# Patient Record
Sex: FEMALE | Employment: STUDENT | URBAN - METROPOLITAN AREA
[De-identification: names, ages, dates, MRNs, and addresses within clinical notes are randomized per-mention and may not be internally consistent; named-entity substitution may affect disease eponyms.]

---

## 2023-04-20 ENCOUNTER — OFFICE VISIT (OUTPATIENT)
Dept: OBGYN CLINIC | Facility: CLINIC | Age: 28
End: 2023-04-20

## 2023-04-20 VITALS
WEIGHT: 201 LBS | HEIGHT: 64 IN | DIASTOLIC BLOOD PRESSURE: 77 MMHG | BODY MASS INDEX: 34.31 KG/M2 | SYSTOLIC BLOOD PRESSURE: 111 MMHG

## 2023-04-20 DIAGNOSIS — Z01.419 ENCOUNTER FOR WELL WOMAN EXAM WITH ROUTINE GYNECOLOGICAL EXAM: Primary | ICD-10-CM

## 2023-04-20 DIAGNOSIS — Z11.3 SCREEN FOR STD (SEXUALLY TRANSMITTED DISEASE): ICD-10-CM

## 2023-04-20 DIAGNOSIS — N92.6 IRREGULAR MENSES: ICD-10-CM

## 2023-04-20 DIAGNOSIS — E04.9 ENLARGED THYROID: ICD-10-CM

## 2023-04-20 PROCEDURE — 87660 TRICHOMONAS VAGIN DIR PROBE: CPT | Performed by: OBSTETRICS & GYNECOLOGY

## 2023-04-20 PROCEDURE — 87591 N.GONORRHOEAE DNA AMP PROB: CPT | Performed by: OBSTETRICS & GYNECOLOGY

## 2023-04-20 PROCEDURE — 87491 CHLMYD TRACH DNA AMP PROBE: CPT | Performed by: OBSTETRICS & GYNECOLOGY

## 2023-04-20 PROCEDURE — 87510 GARDNER VAG DNA DIR PROBE: CPT | Performed by: OBSTETRICS & GYNECOLOGY

## 2023-04-20 PROCEDURE — 87480 CANDIDA DNA DIR PROBE: CPT | Performed by: OBSTETRICS & GYNECOLOGY

## 2023-04-21 ENCOUNTER — TELEPHONE (OUTPATIENT)
Dept: OBGYN CLINIC | Facility: CLINIC | Age: 28
End: 2023-04-21

## 2023-04-21 LAB
C TRACH DNA SPEC QL NAA+PROBE: POSITIVE
N GONORRHOEA DNA SPEC QL NAA+PROBE: NEGATIVE

## 2023-04-21 RX ORDER — DOXYCYCLINE HYCLATE 100 MG/1
100 CAPSULE ORAL 2 TIMES DAILY
Qty: 14 CAPSULE | Refills: 0 | Status: SHIPPED | OUTPATIENT
Start: 2023-04-21 | End: 2023-04-21

## 2023-04-21 RX ORDER — DOXYCYCLINE HYCLATE 100 MG/1
100 CAPSULE ORAL 2 TIMES DAILY
Qty: 14 CAPSULE | Refills: 1 | Status: SHIPPED | OUTPATIENT
Start: 2023-04-21 | End: 2023-04-28

## 2023-04-21 RX ORDER — AZITHROMYCIN 500 MG/1
TABLET, FILM COATED ORAL
Qty: 2 TABLET | Refills: 0 | Status: SHIPPED | OUTPATIENT
Start: 2023-04-21 | End: 2023-04-25

## 2023-04-21 RX ORDER — AZITHROMYCIN 1 G
1 PACKET (EA) ORAL ONCE
Qty: 1 EACH | Refills: 0 | Status: SHIPPED | OUTPATIENT
Start: 2023-04-21 | End: 2023-04-21

## 2023-04-21 NOTE — TELEPHONE ENCOUNTER
Patient name and  verified. Patient informed positive lab results. Patient treated per office protocol. Denies allergies. Advised patient can treat partner. Patient states she will talk to him first and then contact office. STI prevention reviewed with patient. STI form generated and faxed to [facility].

## 2023-04-21 NOTE — TELEPHONE ENCOUNTER
Patient name and  verified. Patient contacted office and was not able to provide partners information for this RN to send medication. Informed patient a new rx can be ordered for patient containing one refill. Also informed patient unsure if health plan will cover both medications. Verbalized understanding.

## 2023-05-15 LAB — HPV I/H RISK 1 DNA SPEC QL NAA+PROBE: NEGATIVE

## 2023-05-16 ENCOUNTER — HOSPITAL ENCOUNTER (OUTPATIENT)
Dept: ULTRASOUND IMAGING | Age: 28
Discharge: HOME | End: 2023-05-16
Attending: OBSTETRICS & GYNECOLOGY
Payer: COMMERCIAL

## 2023-05-16 DIAGNOSIS — E04.9 ENLARGED THYROID: ICD-10-CM

## 2023-05-16 PROCEDURE — 76536 US EXAM OF HEAD AND NECK: CPT | Performed by: OBSTETRICS & GYNECOLOGY

## 2023-05-18 ENCOUNTER — LAB ENCOUNTER (OUTPATIENT)
Dept: LAB | Age: 28
End: 2023-05-18
Attending: OBSTETRICS & GYNECOLOGY
Payer: COMMERCIAL

## 2023-05-18 DIAGNOSIS — N92.6 IRREGULAR MENSES: ICD-10-CM

## 2023-05-18 DIAGNOSIS — N92.6 IRREGULAR MENSTRUAL CYCLE: Primary | ICD-10-CM

## 2023-05-18 DIAGNOSIS — Z11.3 SCREEN FOR STD (SEXUALLY TRANSMITTED DISEASE): ICD-10-CM

## 2023-05-18 LAB
CHOLEST SERPL-MCNC: 210 MG/DL (ref ?–200)
DHEA-S SERPL-MCNC: 209.7 UG/DL
EST. AVERAGE GLUCOSE BLD GHB EST-MCNC: 100 MG/DL (ref 68–126)
ESTRADIOL SERPL-MCNC: 34.9 PG/ML
FASTING PATIENT GLUCOSE ANSWER: YES
FASTING PATIENT LIPID ANSWER: YES
FSH SERPL-ACNC: 5.7 MIU/ML
GLUCOSE BLD-MCNC: 90 MG/DL (ref 70–99)
HBA1C MFR BLD: 5.1 % (ref ?–5.7)
HBV SURFACE AG SER-ACNC: 0.1 [IU]/L
HBV SURFACE AG SERPL QL IA: NONREACTIVE
HCV AB SERPL QL IA: NONREACTIVE
HDLC SERPL-MCNC: 55 MG/DL (ref 40–59)
INSULIN SERPL-ACNC: 20.7 MU/L (ref 3–25)
LDLC SERPL CALC-MCNC: 134 MG/DL (ref ?–100)
LH SERPL-ACNC: 6.2 MIU/ML
NONHDLC SERPL-MCNC: 155 MG/DL (ref ?–130)
PROLACTIN SERPL-MCNC: 20.6 NG/ML
T PALLIDUM AB SER QL IA: NONREACTIVE
TRIGL SERPL-MCNC: 116 MG/DL (ref 30–149)
TSI SER-ACNC: 1.51 MIU/ML (ref 0.36–3.74)
VLDLC SERPL CALC-MCNC: 21 MG/DL (ref 0–30)

## 2023-05-18 PROCEDURE — 84443 ASSAY THYROID STIM HORMONE: CPT

## 2023-05-18 PROCEDURE — 83001 ASSAY OF GONADOTROPIN (FSH): CPT

## 2023-05-18 PROCEDURE — 86803 HEPATITIS C AB TEST: CPT

## 2023-05-18 PROCEDURE — 83002 ASSAY OF GONADOTROPIN (LH): CPT

## 2023-05-18 PROCEDURE — 84410 TESTOSTERONE BIOAVAILABLE: CPT

## 2023-05-18 PROCEDURE — 83036 HEMOGLOBIN GLYCOSYLATED A1C: CPT

## 2023-05-18 PROCEDURE — 86780 TREPONEMA PALLIDUM: CPT | Performed by: OBSTETRICS & GYNECOLOGY

## 2023-05-18 PROCEDURE — 82627 DEHYDROEPIANDROSTERONE: CPT

## 2023-05-18 PROCEDURE — 80061 LIPID PANEL: CPT

## 2023-05-18 PROCEDURE — 82947 ASSAY GLUCOSE BLOOD QUANT: CPT

## 2023-05-18 PROCEDURE — 83525 ASSAY OF INSULIN: CPT

## 2023-05-18 PROCEDURE — 82670 ASSAY OF TOTAL ESTRADIOL: CPT

## 2023-05-18 PROCEDURE — 87389 HIV-1 AG W/HIV-1&-2 AB AG IA: CPT | Performed by: OBSTETRICS & GYNECOLOGY

## 2023-05-18 PROCEDURE — 87340 HEPATITIS B SURFACE AG IA: CPT

## 2023-05-18 PROCEDURE — 84146 ASSAY OF PROLACTIN: CPT

## 2023-05-24 ENCOUNTER — TELEPHONE (OUTPATIENT)
Dept: OBGYN CLINIC | Facility: CLINIC | Age: 28
End: 2023-05-24

## 2023-05-24 LAB
SEX HORM BIND GLOB: 27.2 NMOL/L
TESTOST % FREE+WEAK BND: 30.9 %
TESTOST FREE+WEAK BND: 6.8 NG/DL
TESTOSTERONE TOT /MS: 22.1 NG/DL

## 2024-04-23 ENCOUNTER — OFFICE VISIT (OUTPATIENT)
Dept: OBGYN CLINIC | Facility: CLINIC | Age: 29
End: 2024-04-23

## 2024-04-23 VITALS — HEIGHT: 64 IN | BODY MASS INDEX: 35 KG/M2

## 2024-04-23 DIAGNOSIS — E28.0 ESTRADIOL EXCESS: ICD-10-CM

## 2024-04-23 DIAGNOSIS — Z12.4 ENCOUNTER FOR PAPANICOLAOU SMEAR FOR CERVICAL CANCER SCREENING: ICD-10-CM

## 2024-04-23 DIAGNOSIS — Z01.419 ENCOUNTER FOR WELL WOMAN EXAM WITH ROUTINE GYNECOLOGICAL EXAM: Primary | ICD-10-CM

## 2024-04-23 DIAGNOSIS — R87.610 ASCUS OF CERVIX WITH NEGATIVE HIGH RISK HPV: ICD-10-CM

## 2024-04-23 PROCEDURE — 90715 TDAP VACCINE 7 YRS/> IM: CPT | Performed by: OBSTETRICS & GYNECOLOGY

## 2024-04-23 PROCEDURE — 90471 IMMUNIZATION ADMIN: CPT | Performed by: OBSTETRICS & GYNECOLOGY

## 2024-04-23 PROCEDURE — 99395 PREV VISIT EST AGE 18-39: CPT | Performed by: OBSTETRICS & GYNECOLOGY

## 2024-04-23 NOTE — PROGRESS NOTES
[facility]  Obstetrics and Gynecology  Gynecology Visit    Chief Complaint   Patient presents with    Annual           Georgiamike Hyatt is a 28 year old female who presents for annual exam.    LMP: 03/23/2024.    Menses regular: yes.    Menstrual flow normal: yes.    Birth control or HRT:  0.   Refill 0  Last Pap Smear: 04/20/2023.  Any history of abnormal paps: No hx abn   Last MMG: n/a  Any Medication Refills needed today?: no  Sleep: 7-8 hours.    Diet: fair.    Exercise: 3-4 x weekly.   Screening labs/Blood work today: no.     Colonoscopy (if over 46 y/o): n/a.   Gardasil:(age 9-46 y/o) up to date.   Genetic Cancer screen (if indicated): no.   Flu (Aug-April): n/a.TDAP (every 10 years) 04/23/2024.      Additional Problems/concerns: Patient complains of fatigue.      Next Appt: annual scheduled    Immunization History   Administered Date(s) Administered    Covid-19 Vaccine Pfizer 30 mcg/0.3 ml 03/27/2021, 04/17/2021    DTAP 02/14/1996, 04/17/1996, 05/29/1996, 05/16/1997, 04/21/2000    DTAP INFANRIX 04/21/2000    DTP 02/14/1996, 04/17/1996, 05/29/1996, 05/16/1997, 04/21/2000    FLUZONE 6 months and older PFS 0.5 ml (49731) 09/25/2019    Fluvirin, 3 Years & >, Im 01/02/2013    HEP A,Ped/Adol,(2 Dose) 04/21/2008, 09/24/2010    HEP B, Ped/Adol 12/13/1995, 02/14/1996, 09/18/1996    HIB 02/14/1996, 02/14/1996, 04/17/1996, 04/17/1996, 05/29/1996, 05/29/1996, 02/14/1997, 02/14/1997    HPV (Gardasil) 04/21/2008, 08/18/2008, 05/27/2009    Hib, Unspecified Formulation 02/14/1996, 04/17/1996, 05/29/1996, 02/14/1997    Hpv Virus Vaccine 9 Sherine Im 04/20/2023    IPV 02/14/1996, 04/17/1996, 05/20/1996, 05/16/1997    Influenza 09/24/2010, 02/02/2012    MMR 11/13/1996, 04/21/2000    Meningococcal-Menactra 05/27/2009    OPV 02/14/1996, 04/17/1996, 05/29/1996, 02/14/1997, 05/16/1997, 04/21/2000    TDAP 04/13/2010    Varicella 04/21/2000, 04/21/2008       No current outpatient medications on file.    No Known Allergies    OB  History    Para Term  AB Living   0 0 0 0 0 0   SAB IAB Ectopic Multiple Live Births   0 0 0 0 0       No past medical history on file.    No past surgical history on file.    Family History   Problem Relation Age of Onset    Other (ovarian cyst) Mother     Other (ovarian cyst) Other         Tobacco  Allergies         Social History     Socioeconomic History    Marital status: Single     Spouse name: Not on file    Number of children: Not on file    Years of education: Not on file    Highest education level: Not on file   Occupational History    Not on file   Tobacco Use    Smoking status: Never     Passive exposure: Never    Smokeless tobacco: Never   Vaping Use    Vaping status: Never Used   Substance and Sexual Activity    Alcohol use: Yes     Comment: 1 time per month, socially    Drug use: Never    Sexual activity: Not on file   Other Topics Concern    Not on file   Social History Narrative    Not on file     Social Determinants of Health     Financial Resource Strain: Not on file   Food Insecurity: Not on file   Transportation Needs: Not on file   Physical Activity: Not on file   Stress: Not on file   Social Connections: Not on file   Housing Stability: At Risk (2023)    Received from [facility]     Living Situation: Not on file     Housing Problems: Not on file       Ht 5' 4\" (1.626 m)   Wt (P) 228 lb 6.3 oz (103.6 kg)   LMP 2024   BMI (P) 39.20 kg/m²     Wt Readings from Last 3 Encounters:   24 (P) 228 lb 6.3 oz (103.6 kg)   23 201 lb (91.2 kg)         Health Maintenance   Topic Date Due    Influenza Vaccine (1) 2021    Screen for Cervical Cancer 2021    DTaP,Tdap and Td Vaccines (3 - Td or Tdap) 2025    Hepatitis C Screening Completed    HIV Screening Completed    COVID-19 Vaccine Completed     Review of Systems   General: Present- Feeling well. Not Present- Chills, Fever, Weight Gain and Weight Loss.  HEENT: Not Present- Headache  and Sore Throat.  Respiratory: Not Present- Cough, Difficulty Breathing, Hemoptysis and Sputum Production.  Cardiovascular: Not Present- Chest Pain, Elevated Blood Pressure, Fainting / Blacking Out and Shortness of Breath.  Gastrointestinal: Not Present- Constipation, Diarrhea, Nausea and Vomiting.  Female Genitourinary: Not Present- Discharge, Dysuria and Frequency.  Musculoskeletal: Not Present- Leg Cramps and Swelling of Extremities.  Neurological: Not Present- Dizziness and Headaches.  Psychiatric: Not Present- Anxiety and Depression.  Endocrine: Not Present- Appetite Changes, Hair Changes and Thyroid Problems.  Hematology: Not Present- Easy Bruising and Excessive bleeding.  All other systems negative     Physical Exam The physical exam findings are as follows:     General   Mental Status - Alert. General Appearance - Cooperative. Orientation - Oriented X4. Build & Nutrition - Well nourished.    Head and Neck  Thyroid   Gland Characteristics - normal size and consistency.    Chest and Lung Exam   Inspection:   Chest Wall: - Normal.  Percussion:   Quality and Intensity: - Percussion normal.  Palpation: - Palpation normal.  Auscultation:   Breath sounds: - Normal.  Adventitious sounds: - No Adventitious sounds.    Breast   Nipples: Characteristics - Bilateral - Normal. Discharge - Bilateral - None.  Breast - Bilateral - Symmetric.    Cardiovascular   Auscultation: Rhythm - Regular. Heart Sounds - Normal heart sounds.  Murmurs & Other Heart Sounds: Auscultation of the heart reveals - No Murmurs.    Abdomen   Inspection: Inspection of the abdomen reveals - No Hernias. Incisional scars - no incisional scars.  Palpation/Percussion: Palpation and Percussion of the abdomen reveal - Non Tender and No Palpable abdominal masses.  Liver: - Normal.  Auscultation: Auscultation of the abdomen reveals - Bowel sounds normal.    Female Genitourinary     External Genitalia   Perineum - Normal. Bartholin's Gland - Bilateral -  Normal. Clitoris - Normal.  Introitus: Characteristics - No Cystocele, Enterocele or Rectocele. Discharge - None.  Labia Majora: Lesions - Bilateral - None. Characteristics - Bilateral - Normal.  Labia Minora: Lesions - Bilateral - None. Characteristics - Bilateral - Normal.  Urethra: Characteristics - Normal. Discharge - None.  [place]bee Gland - Bilateral - Normal.  Vulva: Characteristics - Normal. Lesions - None.    Speculum & Bimanual   Vagina:   Vaginal Wall: - Normal.  Vaginal Lesions - None. Vaginal Mucosa - Normal.  Cervix: Characteristics - No Motion tenderness. Discharge - None.  Uterus: Characteristics - Normal. Position - Midposition.  Adnexa: Characteristics - Bilateral - Normal. Masses - No Adnexal Masses.      Rectal   Anorectal Exam: External - normal external exam.    Peripheral Vascular   Upper Extremity:   Palpation: - Pulses bilaterally normal.  Lower Extremity: Inspection - Bilateral - Inspection Normal.  Palpation: Edema - Bilateral - No edema.    Neurologic   Mental Status: - Normal.    Lymphatic  General Lymphatics   Description - Normal .       1. Encounter for well woman exam with routine gynecological exam  - ThinPrep PAP with HPV Reflex Request B; Future    2. Encounter for Papanicolaou smear for cervical cancer screening  - ThinPrep PAP with HPV Reflex Request B; Future    3. ASCUS of cervix with negative high risk HPV  - ThinPrep PAP with HPV Reflex Request B; Future    4. Estradiol excess  - US PELVIS (TRANSABDOMINAL AND TRANSVAGINAL) (CPT=76856/81009); Future

## 2024-04-27 LAB
.: NORMAL
.: NORMAL

## 2024-05-14 ENCOUNTER — HOSPITAL ENCOUNTER (OUTPATIENT)
Dept: ULTRASOUND IMAGING | Age: 29
Discharge: HOME | End: 2024-05-14
Attending: OBSTETRICS & GYNECOLOGY

## 2024-05-14 DIAGNOSIS — E28.0 ESTRADIOL EXCESS: ICD-10-CM

## 2024-05-14 PROCEDURE — 76856 US EXAM PELVIC COMPLETE: CPT | Performed by: OBSTETRICS & GYNECOLOGY

## 2024-05-14 PROCEDURE — 76830 TRANSVAGINAL US NON-OB: CPT | Performed by: OBSTETRICS & GYNECOLOGY

## 2024-12-13 ENCOUNTER — OFFICE VISIT (OUTPATIENT)
Dept: FAMILY MEDICINE CLINIC | Facility: CLINIC | Age: 29
End: 2024-12-13
Payer: COMMERCIAL

## 2024-12-13 VITALS
HEIGHT: 64 IN | SYSTOLIC BLOOD PRESSURE: 110 MMHG | RESPIRATION RATE: 18 BRPM | OXYGEN SATURATION: 98 % | DIASTOLIC BLOOD PRESSURE: 60 MMHG | WEIGHT: 235 LBS | BODY MASS INDEX: 40.12 KG/M2 | HEART RATE: 101 BPM

## 2024-12-13 DIAGNOSIS — Z00.00 ROUTINE GENERAL MEDICAL EXAMINATION AT A HEALTH CARE FACILITY: Primary | ICD-10-CM

## 2024-12-13 DIAGNOSIS — N92.6 MISSED PERIODS: ICD-10-CM

## 2024-12-13 DIAGNOSIS — Z00.00 LABORATORY EXAMINATION ORDERED AS PART OF A ROUTINE GENERAL MEDICAL EXAMINATION: ICD-10-CM

## 2024-12-13 DIAGNOSIS — R53.83 OTHER FATIGUE: ICD-10-CM

## 2024-12-13 DIAGNOSIS — Z23 NEED FOR VACCINATION: ICD-10-CM

## 2024-12-13 DIAGNOSIS — D50.9 IRON DEFICIENCY ANEMIA, UNSPECIFIED IRON DEFICIENCY ANEMIA TYPE: ICD-10-CM

## 2024-12-13 DIAGNOSIS — E55.9 VITAMIN D DEFICIENCY: ICD-10-CM

## 2024-12-13 LAB
CONTROL LINE PRESENT WITH A CLEAR BACKGROUND (YES/NO): YES YES/NO
KIT LOT #: NORMAL NUMERIC
PREGNANCY TEST, URINE: NEGATIVE

## 2024-12-13 NOTE — PROGRESS NOTES
HPI:   Georgia Hyatt is a 29 year old female new patient who presents for a complete physical exam. Patient complains of weight gain. Doesn't feel she's changed eating habits, but is gaining weight.     Does she want a flu vaccine today? yes  Last pap 4/2/24; WNL. Sees gyne.   Last eye exam? This year  Last dental exam? Over 5 years     Health History  Periods: was regular; no period within 3 months. Last one in August 2024. Has taken pregnancy tests and they've been negative. Will do one today for reassurance. Does have unprotected intercourse, so pregnancy is possible.   Last Pap (age 25): 4/24/25 (WNL), sees gyne.   Last Mammogram (age 40): not indicated  Doing SBE: yes, discussed how to do monthly  Dexa Scan (50-64 high risk or 65 and up): not indicated   Colonoscopy (age 45-75): not indicated   Diet healthy: eats healthy  Some intermittent diarrhea and constipation. Is doing a daily fiber supplement. Can consider allergy testing if this becomes very bothersome.   Exercise status: cardio, weightlifting 3-5 x a week an hour each time.   Vaccination Status: UTD  Confirm medications & allergies     Family History   No significant hx    Social History  Socioeconomic History  Marital status: no   Number of children: no   Occupation: , different hours  Tobacco Use  Smoking status: no  Vaping Use: Never used  Substance Use and Sexual Activity  Alcohol use: Yes, 2 x monthly   Comment: occ  Drug use: Never  Sexual activity: Yes  Partners: Male  Birth control/protection: No    Other Topics  Concerns:No       Immunization History   Administered Date(s) Administered    Covid-19 Vaccine Pfizer 30 mcg/0.3 ml 03/27/2021, 04/17/2021    DTAP 02/14/1996, 04/17/1996, 05/29/1996, 05/16/1997, 04/21/2000, 04/21/2000    DTP 02/14/1996, 04/17/1996, 05/29/1996, 05/16/1997, 04/21/2000    FLUZONE 6 months and older PFS 0.5 ml (57890) 09/25/2019    Fluvirin, 3 Years & >, Im 01/02/2013    HEP A,Ped/Adol,(2 Dose) 04/21/2008,  09/24/2010    HEP B, Ped/Adol 12/13/1995, 02/14/1996, 09/18/1996    HIB 02/14/1996, 04/17/1996, 05/29/1996, 02/14/1997    HIB PRP-OMP 02/14/1996, 04/17/1996, 05/29/1996, 02/14/1997    HPV (Gardasil) 04/21/2008, 08/18/2008, 05/27/2009    Hib, Unspecified Formulation 02/14/1996, 04/17/1996, 05/29/1996, 02/14/1997    Hpv Virus Vaccine 9 Sherine Im 04/20/2023    IPV 02/14/1996, 04/17/1996, 05/20/1996, 05/16/1997    Influenza 09/24/2010, 02/02/2012    MMR 11/13/1996, 04/21/2000    Meningococcal-Menactra 05/27/2009    OPV 02/14/1996, 04/17/1996, 05/29/1996, 02/14/1997, 05/16/1997, 04/21/2000    TDAP 04/13/2010, 04/23/2024    Varicella 04/21/2000, 04/21/2008     Wt Readings from Last 6 Encounters:   12/13/24 235 lb (106.6 kg)   04/23/24 (P) 228 lb 6.3 oz (103.6 kg)   04/20/23 201 lb (91.2 kg)     Body mass index is 40.34 kg/m².     Lab Results   Component Value Date    GLU 90 05/18/2023     Lab Results   Component Value Date    CHOLEST 210 (H) 05/18/2023     Lab Results   Component Value Date    HDL 55 05/18/2023     Lab Results   Component Value Date     (H) 05/18/2023     No results found for: \"AST\"  No results found for: \"ALT\"    No current outpatient medications on file.      History reviewed. No pertinent past medical history.   History reviewed. No pertinent surgical history.   Family History   Problem Relation Age of Onset    Other (ovarian cyst) Mother     Other (ovarian cyst) Other       Social History:   Social History     Socioeconomic History    Marital status: Single   Tobacco Use    Smoking status: Never     Passive exposure: Never    Smokeless tobacco: Never   Vaping Use    Vaping status: Never Used   Substance and Sexual Activity    Alcohol use: Yes     Comment: 1 time per month, socially    Drug use: Never     Social Drivers of Health      Received from [facility]        REVIEW OF SYSTEMS:   GENERAL: feels well otherwise  SKIN: denies any unusual skin lesions  EYES:denies blurred vision  or double vision  HEENT: denies nasal congestion, sinus pain or ST  LUNGS: denies shortness of breath with exertion  CARDIOVASCULAR: denies chest pain on exertion  GI: denies abdominal pain,denies heartburn, sometimes gets intermittent diarrhea and constipation. Did a home allergy test where she submitted hair and it suggested she was allergic to daily and chicken. She eats both.   : denies dysuria, vaginal discharge or itching,periods were regular but has missed the past few months.    MUSCULOSKELETAL: denies back pain  NEURO: denies headaches  PSYCHE: denies depression or anxiety  HEMATOLOGIC: hx of anemia  ENDOCRINE: denies thyroid history  ALL/ASTHMA: denies hx of allergy or asthma    EXAM:   /60 (BP Location: Left arm, Patient Position: Sitting, Cuff Size: adult)   Pulse 101   Resp 18   Ht 5' 4\" (1.626 m)   Wt 235 lb (106.6 kg)   LMP 08/30/2024   SpO2 98%   BMI 40.34 kg/m²   Body mass index is 40.34 kg/m².   GENERAL: well developed, well nourished,in no apparent distress  SKIN: no rashes,no suspicious lesions  HEENT: atraumatic, normocephalic,ears and throat are clear  EYES:PERRLA, EOMI, normal optic disk,conjunctiva are clear  NECK: supple,no adenopathy,no bruits, thyroid WNL  CHEST: no chest tenderness  LUNGS: clear to auscultation  CARDIO: RRR without murmur  GI: good BS's,no masses, HSM or tenderness  MUSCULOSKELETAL: back is not tender,FROM of the back  EXTREMITIES: no cyanosis, clubbing or edema  NEURO: Oriented times three,cranial nerves are intact,motor and sensory are grossly intact    ASSESSMENT AND PLAN:   Georgia Hyatt is a 29 year old female who presents for a complete physical exam.  Self breast exam explained. Health maintenance, will check fasting Lipids, CMP, and CBC. Pt' s weight is Body mass index is 40.34 kg/m²., recommended low fat diet and aerobic exercise 30 minutes three times weekly.  The patient indicates understanding of these issues and agrees to the plan.    The  patient is asked to return for CPX in 1 year or sooner if she wants to further discuss weight loss. IN OFFICE PREGNANCY TEST IS NEGATIVE, if pt does not get period in another 1-2 months, can discuss giving her a prescription to bring on a bleed.

## 2024-12-16 ENCOUNTER — LAB ENCOUNTER (OUTPATIENT)
Dept: LAB | Age: 29
End: 2024-12-16
Payer: COMMERCIAL

## 2024-12-16 DIAGNOSIS — Z00.00 LABORATORY EXAMINATION ORDERED AS PART OF A ROUTINE GENERAL MEDICAL EXAMINATION: ICD-10-CM

## 2024-12-16 DIAGNOSIS — E55.9 VITAMIN D DEFICIENCY: ICD-10-CM

## 2024-12-16 DIAGNOSIS — R53.83 OTHER FATIGUE: ICD-10-CM

## 2024-12-16 DIAGNOSIS — D50.9 IRON DEFICIENCY ANEMIA, UNSPECIFIED IRON DEFICIENCY ANEMIA TYPE: ICD-10-CM

## 2024-12-16 LAB
ALBUMIN SERPL-MCNC: 4.3 G/DL (ref 3.2–4.8)
ALBUMIN/GLOB SERPL: 1.2 {RATIO} (ref 1–2)
ALP LIVER SERPL-CCNC: 118 U/L
ALT SERPL-CCNC: 54 U/L
ANION GAP SERPL CALC-SCNC: 4 MMOL/L (ref 0–18)
AST SERPL-CCNC: 28 U/L (ref ?–34)
BASOPHILS # BLD AUTO: 0.02 X10(3) UL (ref 0–0.2)
BASOPHILS NFR BLD AUTO: 0.2 %
BILIRUB SERPL-MCNC: 0.3 MG/DL (ref 0.3–1.2)
BILIRUB UR QL STRIP.AUTO: NEGATIVE
BUN BLD-MCNC: 11 MG/DL (ref 9–23)
CALCIUM BLD-MCNC: 10.3 MG/DL (ref 8.7–10.4)
CHLORIDE SERPL-SCNC: 109 MMOL/L (ref 98–112)
CHOLEST SERPL-MCNC: 159 MG/DL (ref ?–200)
CLARITY UR REFRACT.AUTO: CLEAR
CO2 SERPL-SCNC: 23 MMOL/L (ref 21–32)
CORTIS SERPL-MCNC: 13.5 UG/DL
CREAT BLD-MCNC: 0.57 MG/DL
DEPRECATED HBV CORE AB SER IA-ACNC: 70 NG/ML
EGFRCR SERPLBLD CKD-EPI 2021: 126 ML/MIN/1.73M2 (ref 60–?)
EOSINOPHIL # BLD AUTO: 0.16 X10(3) UL (ref 0–0.7)
EOSINOPHIL NFR BLD AUTO: 1.7 %
ERYTHROCYTE [DISTWIDTH] IN BLOOD BY AUTOMATED COUNT: 12.8 %
FASTING PATIENT LIPID ANSWER: YES
FASTING STATUS PATIENT QL REPORTED: YES
GLOBULIN PLAS-MCNC: 3.5 G/DL (ref 2–3.5)
GLUCOSE BLD-MCNC: 86 MG/DL (ref 70–99)
GLUCOSE UR STRIP.AUTO-MCNC: NORMAL MG/DL
HCT VFR BLD AUTO: 41.3 %
HDLC SERPL-MCNC: 39 MG/DL (ref 40–59)
HGB BLD-MCNC: 13.6 G/DL
IMM GRANULOCYTES # BLD AUTO: 0.04 X10(3) UL (ref 0–1)
IMM GRANULOCYTES NFR BLD: 0.4 %
IRON SATN MFR SERPL: 22 %
IRON SERPL-MCNC: 77 UG/DL
KETONES UR STRIP.AUTO-MCNC: NEGATIVE MG/DL
LDLC SERPL CALC-MCNC: 89 MG/DL (ref ?–100)
LEUKOCYTE ESTERASE UR QL STRIP.AUTO: NEGATIVE
LYMPHOCYTES # BLD AUTO: 3.17 X10(3) UL (ref 1–4)
LYMPHOCYTES NFR BLD AUTO: 32.7 %
MCH RBC QN AUTO: 31.3 PG (ref 26–34)
MCHC RBC AUTO-ENTMCNC: 32.9 G/DL (ref 31–37)
MCV RBC AUTO: 95.2 FL
MONOCYTES # BLD AUTO: 0.66 X10(3) UL (ref 0.1–1)
MONOCYTES NFR BLD AUTO: 6.8 %
NEUTROPHILS # BLD AUTO: 5.64 X10 (3) UL (ref 1.5–7.7)
NEUTROPHILS # BLD AUTO: 5.64 X10(3) UL (ref 1.5–7.7)
NEUTROPHILS NFR BLD AUTO: 58.2 %
NITRITE UR QL STRIP.AUTO: NEGATIVE
NONHDLC SERPL-MCNC: 120 MG/DL (ref ?–130)
OSMOLALITY SERPL CALC.SUM OF ELEC: 281 MOSM/KG (ref 275–295)
PH UR STRIP.AUTO: 5.5 [PH] (ref 5–8)
PLATELET # BLD AUTO: 325 10(3)UL (ref 150–450)
POTASSIUM SERPL-SCNC: 4.1 MMOL/L (ref 3.5–5.1)
PROT SERPL-MCNC: 7.8 G/DL (ref 5.7–8.2)
PROT UR STRIP.AUTO-MCNC: NEGATIVE MG/DL
RBC # BLD AUTO: 4.34 X10(6)UL
RBC UR QL AUTO: NEGATIVE
SODIUM SERPL-SCNC: 136 MMOL/L (ref 136–145)
SP GR UR STRIP.AUTO: 1.02 (ref 1–1.03)
TOTAL IRON BINDING CAPACITY: 343 UG/DL (ref 250–425)
TRANSFERRIN SERPL-MCNC: 271 MG/DL (ref 250–380)
TRIGL SERPL-MCNC: 183 MG/DL (ref 30–149)
TSI SER-ACNC: 3.06 UIU/ML (ref 0.55–4.78)
UROBILINOGEN UR STRIP.AUTO-MCNC: NORMAL MG/DL
VIT B12 SERPL-MCNC: 492 PG/ML (ref 211–911)
VIT D+METAB SERPL-MCNC: 12.7 NG/ML (ref 30–100)
VLDLC SERPL CALC-MCNC: 30 MG/DL (ref 0–30)
WBC # BLD AUTO: 9.7 X10(3) UL (ref 4–11)

## 2024-12-16 PROCEDURE — 82306 VITAMIN D 25 HYDROXY: CPT

## 2024-12-16 PROCEDURE — 80061 LIPID PANEL: CPT

## 2024-12-16 PROCEDURE — 82728 ASSAY OF FERRITIN: CPT

## 2024-12-16 PROCEDURE — 84443 ASSAY THYROID STIM HORMONE: CPT

## 2024-12-16 PROCEDURE — 80053 COMPREHEN METABOLIC PANEL: CPT

## 2024-12-16 PROCEDURE — 83550 IRON BINDING TEST: CPT

## 2024-12-16 PROCEDURE — 81003 URINALYSIS AUTO W/O SCOPE: CPT

## 2024-12-16 PROCEDURE — 82533 TOTAL CORTISOL: CPT

## 2024-12-16 PROCEDURE — 82607 VITAMIN B-12: CPT

## 2024-12-16 PROCEDURE — 36415 COLL VENOUS BLD VENIPUNCTURE: CPT

## 2024-12-16 PROCEDURE — 83540 ASSAY OF IRON: CPT

## 2024-12-16 PROCEDURE — 85025 COMPLETE CBC W/AUTO DIFF WBC: CPT

## 2024-12-19 ENCOUNTER — TELEMEDICINE (OUTPATIENT)
Dept: FAMILY MEDICINE CLINIC | Facility: CLINIC | Age: 29
End: 2024-12-19
Payer: COMMERCIAL

## 2024-12-19 DIAGNOSIS — N94.6 PAINFUL MENSTRUAL PERIODS: Primary | ICD-10-CM

## 2024-12-19 DIAGNOSIS — N92.6 MISSED PERIODS: ICD-10-CM

## 2024-12-19 RX ORDER — ERGOCALCIFEROL 1.25 MG/1
50000 CAPSULE, LIQUID FILLED ORAL WEEKLY
Qty: 12 CAPSULE | Refills: 0 | Status: SHIPPED | OUTPATIENT
Start: 2024-12-19

## 2024-12-21 NOTE — PROGRESS NOTES
Telehealth outside of [facility]  Telehealth Verbal Consent   I conducted a telehealth visit with Georgia Hyatt today, 12/21/24, which was completed using two-way, real-time interactive audio and video communication. This has been done in good ashu to provide continuity of care in the best interest of the provider-patient relationship, due to the COVID - public health crisis/national emergency where restrictions of face-to-face office visits are ongoing. Every conscious effort was taken to allow for sufficient and adequate time to complete the visit.  The patient was made aware of the limitations of the telehealth visit, including treatment limitations as no physical exam could be performed.  The patient was advised to call 911 or to go to the ER in case there was an emergency.  The patient was also advised of the potential privacy & security concerns related to the telehealth platform.   The patient was made aware of where to find [facility]'s notice of privacy practices, telehealth consent form and other related consent forms and documents.  which are located on the [facility] website. The patient verbally agreed to telehealth consent form, related consents and the risks discussed.    Lastly, the patient confirmed that they were in [place].   Included in this visit, time may have been spent reviewing labs, medications, radiology tests and decision making. Appropriate medical decision-making and tests are ordered as detailed in the plan of care above.  Coding/billing information is submitted for this visit based on complexity of care and/or time spent for the visit.  Subjective:   Georgia Hyatt is a 29 year old female who presents for review labs and discuss amenorrhea.       History/Other:    No Further Nursing Notes to Review         Tobacco:  She has never smoked tobacco.    Current Outpatient Medications   Medication Sig Dispense Refill    ergocalciferol 1.25 MG (11649 UT) Oral Cap Take 1 capsule (50,000 Units total)  by mouth once a week. After these are gone, take OTC vitamin D3 2000 IUs daily. 12 capsule 0         Review of Systems:  Review of Systems   Constitutional:  Positive for fatigue.   Genitourinary:         Has not had period in about 6 months   All other systems reviewed and are negative.        Objective:   LMP 08/30/2024  Estimated body mass index is 40.34 kg/m² as calculated from the following:    Height as of 12/13/24: 5' 4\" (1.626 m).    Weight as of 12/13/24: 235 lb (106.6 kg).  Physical Exam  No px exam provided during this VV    Assessment & Plan:   1. Painful menstrual periods (Primary)  -     OBG Referral - [facility] ([place])  2. Missed periods  -     OBG Referral - [facility] ([place])  Other orders  -     Vitamin D (Ergocalciferol); Take 1 capsule (50,000 Units total) by mouth once a week. After these are gone, take OTC vitamin D3 2000 IUs daily.  Dispense: 12 capsule; Refill: 0    Pt had seen Ob/gyne in past. They did a pelvic US and did not have resolution for pt. She was under the impression she was being worked up for PCOS.   Reviewed labs. Vitamin D very low. Rx sent.   Referral given for second opinion ob/gyne. Will have her follow up with them and see if they'd like to do labs prior to bringing on a bleed.       No follow-ups on file.  Future Appointments   Date Time Provider Department Center   12/27/2024 11:00 AM Tessa Stewart MD [facility]   4/29/2025  6:45 PM Tessa Stewart MD [facility] Angela Jarzyna, APRN, 12/21/2024, 10:56 AM

## 2025-01-23 ENCOUNTER — OFFICE VISIT (OUTPATIENT)
Dept: OBGYN CLINIC | Facility: CLINIC | Age: 30
End: 2025-01-23
Payer: COMMERCIAL

## 2025-01-23 VITALS
HEART RATE: 100 BPM | BODY MASS INDEX: 41 KG/M2 | SYSTOLIC BLOOD PRESSURE: 116 MMHG | DIASTOLIC BLOOD PRESSURE: 80 MMHG | WEIGHT: 239 LBS

## 2025-01-23 DIAGNOSIS — Z11.3 SCREEN FOR STD (SEXUALLY TRANSMITTED DISEASE): ICD-10-CM

## 2025-01-23 DIAGNOSIS — O99.210 OBESITY AFFECTING PREGNANCY, ANTEPARTUM, UNSPECIFIED OBESITY TYPE (HCC): ICD-10-CM

## 2025-01-23 DIAGNOSIS — N91.2 AMENORRHEA: Primary | ICD-10-CM

## 2025-01-23 PROCEDURE — 87491 CHLMYD TRACH DNA AMP PROBE: CPT | Performed by: NURSE PRACTITIONER

## 2025-01-23 PROCEDURE — 99214 OFFICE O/P EST MOD 30 MIN: CPT | Performed by: NURSE PRACTITIONER

## 2025-01-23 PROCEDURE — 3074F SYST BP LT 130 MM HG: CPT | Performed by: NURSE PRACTITIONER

## 2025-01-23 PROCEDURE — 3079F DIAST BP 80-89 MM HG: CPT | Performed by: NURSE PRACTITIONER

## 2025-01-23 PROCEDURE — 87591 N.GONORRHOEAE DNA AMP PROB: CPT | Performed by: NURSE PRACTITIONER

## 2025-01-23 PROCEDURE — 81025 URINE PREGNANCY TEST: CPT | Performed by: NURSE PRACTITIONER

## 2025-01-23 RX ORDER — MEDROXYPROGESTERONE ACETATE 10 MG
10 TABLET ORAL DAILY
Qty: 60 TABLET | Refills: 1 | Status: SHIPPED | OUTPATIENT
Start: 2025-01-23 | End: 2025-02-02

## 2025-01-23 NOTE — PROGRESS NOTES
Here for new gynecology visit.  29 year old G 1 P 0.  Patient's last menstrual period was 2024 (approximate)..     Here to discuss irregular menses.    Her menses have been normal in flow. Her cycle length can be somewhat irregular varying between 30- 45/50 days.    In 2024 they became very light but were still regular. At that time ovulation became painful    Her menses abruptly stopped 2024. She has noted a significant amount of weight gain in the last year despite no lifestyle changes.  She has also had increased facial hair, thinning hair and hair loss.     Last pap smear was in  and it was normal.  No hx of abnormal pap smears.     OB Hx:  1 AB.    Family gyn hx:  PCOS.   Family breast hx:  neg.    History reviewed. No pertinent past medical history.    History reviewed. No pertinent surgical history.    Medications Ordered Prior to Encounter[1]    OB History    Para Term  AB Living   1 0 0 0 1 0   SAB IAB Ectopic Multiple Live Births   0 0 0 0 0      # Outcome Date GA Lbr Jesse/2nd Weight Sex Type Anes PTL Lv   1 AB      THERAPEUTIC          ROS:    General:  No wt loss, wt gain, appetite changes.    /80   Pulse 100   Wt 239 lb (108.4 kg)   LMP 2024 (Approximate)   BMI 41.02 kg/m²     Exam deferred    IMP/PLAN:    1. Amenorrhea  - Hemoglobin A1C; Future  - Urine Preg Test [63578]  - medroxyPROGESTERone Acetate (PROVERA) 10 MG Oral Tab; Take 1 tablet (10 mg total) by mouth daily for 10 days. Repeat every 2-3 months  Dispense: 60 tablet; Refill: 1    2. Screen for STD (sexually transmitted disease)  - HIV AG AB COMBO; Future  - Hepatitis Panel, Acute (4); Future  - T Pallidum Screening Cascade; Future  - Chlamydia/Gc Amplification; Future  - Chlamydia/Gc Amplification    3. Obesity affecting pregnancy, antepartum, unspecified obesity type (HCC)  - [facility] - Sosa Blake PA-C [address] [place] [address], [address]    See in 6 months.              [1]   Current Outpatient Medications on File Prior to Visit   Medication Sig Dispense Refill    ergocalciferol 1.25 MG (59166 UT) Oral Cap Take 1 capsule (50,000 Units total) by mouth once a week. After these are gone, take OTC vitamin D3 2000 IUs daily. 12 capsule 0     No current facility-administered medications on file prior to visit.

## 2025-01-24 LAB
C TRACH DNA SPEC QL NAA+PROBE: NEGATIVE
N GONORRHOEA DNA SPEC QL NAA+PROBE: NEGATIVE

## 2025-01-27 ENCOUNTER — OFFICE VISIT (OUTPATIENT)
Dept: FAMILY MEDICINE CLINIC | Facility: CLINIC | Age: 30
End: 2025-01-27
Payer: COMMERCIAL

## 2025-01-27 VITALS
OXYGEN SATURATION: 99 % | BODY MASS INDEX: 39.95 KG/M2 | HEART RATE: 90 BPM | TEMPERATURE: 98 F | SYSTOLIC BLOOD PRESSURE: 106 MMHG | HEIGHT: 64 IN | DIASTOLIC BLOOD PRESSURE: 76 MMHG | WEIGHT: 234 LBS | RESPIRATION RATE: 18 BRPM

## 2025-01-27 DIAGNOSIS — H60.331 ACUTE SWIMMER'S EAR OF RIGHT SIDE: Primary | ICD-10-CM

## 2025-01-27 RX ORDER — OFLOXACIN 3 MG/ML
10 SOLUTION AURICULAR (OTIC) 2 TIMES DAILY
Qty: 10 ML | Refills: 0 | Status: SHIPPED | OUTPATIENT
Start: 2025-01-27 | End: 2025-02-06

## 2025-01-28 NOTE — PROGRESS NOTES
CHIEF COMPLAINT:     Chief Complaint   Patient presents with    Ear Problem     Right ear pain. For 3 weeks   OTC: Ear drops        HPI:   Georgia Hyatt is a 29 year old female who presents to clinic today with complaints of right ear pain that presented 3 weeks ago.  Pain is described as dull ache and is located deep in the ear. Started with itching, but progressed to pain.  Patient denies history of ear infections.  Movement of the external ear makes ear pain worse, but otc ear drops are palliative.     Associated symptoms:  Patient denies decreased hearing.  Patient denies fever. Patient denies hearing loss. Patient denies drainage. Patient denies use of Q-tips to clean the ears. Patient denies nasal congestion.  Pt admits to using ear buds often while working out.     Current Outpatient Medications   Medication Sig Dispense Refill    ofloxacin 0.3 % Otic Solution Place 10 drops into the right ear 2 (two) times daily for 10 days. 10 mL 0    medroxyPROGESTERone Acetate (PROVERA) 10 MG Oral Tab Take 1 tablet (10 mg total) by mouth daily for 10 days. Repeat every 2-3 months 60 tablet 1    ergocalciferol 1.25 MG (91192 UT) Oral Cap Take 1 capsule (50,000 Units total) by mouth once a week. After these are gone, take OTC vitamin D3 2000 IUs daily. 12 capsule 0     No current facility-administered medications for this visit.      No past medical history on file.   Social History:  Social History     Socioeconomic History    Marital status: Single   Tobacco Use    Smoking status: Never     Passive exposure: Never    Smokeless tobacco: Never   Vaping Use    Vaping status: Never Used   Substance and Sexual Activity    Alcohol use: Yes     Comment: 1 time per month, socially    Drug use: Never    Sexual activity: Yes     Partners: Male     Birth control/protection: Condom     Social Drivers of Health      Received from [facility]        REVIEW OF SYSTEMS:   GENERAL: Feeling well otherwise.    SKIN: no  unusual skin lesions or rashes  HEENT: See HPI  LUNGS: No cough, shortness of breath, or wheezing.  CARDIOVASCULAR: No chest pain, palpitations  GI: No N/V/C/D. No abdominal pain.  NEURO: denies headaches or dizziness    EXAM:   /76   Pulse 90   Temp 98.1 °F (36.7 °C)   Resp 18   Ht 5' 4\" (1.626 m)   Wt 234 lb (106.1 kg)   LMP  (LMP Unknown)   SpO2 99%   BMI 40.17 kg/m²   GENERAL: well developed, well nourished,in no apparent distress  SKIN: no rashes,no suspicious lesions  HEAD: atraumatic, normocephalic  EYES: conjunctivae clear, EOM intact  EARS: Tragus tender and pain with movement of the auricle on right. Left is non-tender. External auditory canals: patent b/l. Right canal mildly erythematous.  Right TM: pearly, no bulging, no retraction,no effusion, bony landmarks present.  Left TM: pearly, no bulging, no retraction,no effusion, bony landmarks present.  NOSE: nostrils patent, clear exudates, nasal mucosa pink and noninflamed  THROAT: oral mucosa pink, moist. Posterior pharynx is not erythematous or injected. No exudates.  NECK: supple, non-tender  LUNGS: clear to auscultation bilaterally, no wheezes or rhonchi. Breathing is non labored.  CARDIO: RRR without murmur  EXTREMITIES: no cyanosis, clubbing or edema  LYMPH: + right-sided post auricular and submandibular lymphadenopathy.      ASSESSMENT AND PLAN:   Georgia Hyatt is a 29 year old female who presents with:    ASSESSMENT:  Encounter Diagnosis   Name Primary?    Acute swimmer's ear of right side Yes       PLAN: Meds as listed below.  Comfort measures as described in Patient Instructions    Meds & Refills for this Visit:  Requested Prescriptions     Signed Prescriptions Disp Refills    ofloxacin 0.3 % Otic Solution 10 mL 0     Sig: Place 10 drops into the right ear 2 (two) times daily for 10 days.         Risk and benefits of medication discussed. Stressed importance of completing full course of antibiotic.     Tylenol/Motrin prn pain.       Call or return if s/sx worsen, do not improve in 3 days, or if fever of 100.4 or greater persists for 72 hours.    Patient Instructions   Instill 10 drops in right ear(s) twice daily for 7-10 days.   You may place a cotton ball in the ear to absorb excess drops if needed and remove after 30 minutes, or leave in if it helps with comfort.   Use otc ibuprofen or tylenol for comfort.   Consider warm compresses to the right side of the neck to help reduce lymphatic congestion and swelling.   If no better or if symptoms worsen or persist, follow-up with your PCP for further evaluation.       Patient voiced understand and is in agreement with treatment plan.

## 2025-01-28 NOTE — PATIENT INSTRUCTIONS
Instill 10 drops in right ear(s) twice daily for 7-10 days.   You may place a cotton ball in the ear to absorb excess drops if needed and remove after 30 minutes, or leave in if it helps with comfort.   Use otc ibuprofen or tylenol for comfort.   Consider warm compresses to the right side of the neck to help reduce lymphatic congestion and swelling.   If no better or if symptoms worsen or persist, follow-up with your PCP for further evaluation.

## 2025-01-30 ENCOUNTER — OFFICE VISIT (OUTPATIENT)
Dept: INTERNAL MEDICINE CLINIC | Facility: CLINIC | Age: 30
End: 2025-01-30
Payer: COMMERCIAL

## 2025-01-30 ENCOUNTER — EKG ENCOUNTER (OUTPATIENT)
Dept: LAB | Age: 30
End: 2025-01-30
Attending: NURSE PRACTITIONER
Payer: COMMERCIAL

## 2025-01-30 ENCOUNTER — LAB ENCOUNTER (OUTPATIENT)
Dept: LAB | Age: 30
End: 2025-01-30
Attending: NURSE PRACTITIONER
Payer: COMMERCIAL

## 2025-01-30 VITALS
RESPIRATION RATE: 16 BRPM | WEIGHT: 237 LBS | SYSTOLIC BLOOD PRESSURE: 110 MMHG | HEIGHT: 64 IN | BODY MASS INDEX: 40.46 KG/M2 | DIASTOLIC BLOOD PRESSURE: 60 MMHG | HEART RATE: 96 BPM

## 2025-01-30 DIAGNOSIS — Z11.3 SCREEN FOR STD (SEXUALLY TRANSMITTED DISEASE): ICD-10-CM

## 2025-01-30 DIAGNOSIS — E66.01 OBESITY, MORBID, BMI 40.0-49.9 (HCC): ICD-10-CM

## 2025-01-30 DIAGNOSIS — Z51.81 THERAPEUTIC DRUG MONITORING: Primary | ICD-10-CM

## 2025-01-30 DIAGNOSIS — E78.1 HIGH TRIGLYCERIDES: ICD-10-CM

## 2025-01-30 DIAGNOSIS — N91.2 AMENORRHEA: ICD-10-CM

## 2025-01-30 DIAGNOSIS — F43.9 STRESS: ICD-10-CM

## 2025-01-30 DIAGNOSIS — E55.9 VITAMIN D DEFICIENCY: ICD-10-CM

## 2025-01-30 DIAGNOSIS — Z51.81 THERAPEUTIC DRUG MONITORING: ICD-10-CM

## 2025-01-30 DIAGNOSIS — E28.2 PCOS (POLYCYSTIC OVARIAN SYNDROME): ICD-10-CM

## 2025-01-30 LAB
ATRIAL RATE: 84 BPM
EST. AVERAGE GLUCOSE BLD GHB EST-MCNC: 114 MG/DL (ref 68–126)
HAV IGM SER QL: NONREACTIVE
HBA1C MFR BLD: 5.6 % (ref ?–5.7)
HBV CORE IGM SER QL: NONREACTIVE
HBV SURFACE AG SERPL QL IA: NONREACTIVE
HCV AB SERPL QL IA: NONREACTIVE
P AXIS: 21 DEGREES
P-R INTERVAL: 158 MS
Q-T INTERVAL: 406 MS
QRS DURATION: 82 MS
QTC CALCULATION (BEZET): 479 MS
R AXIS: 48 DEGREES
T AXIS: 29 DEGREES
T PALLIDUM AB SER QL IA: NONREACTIVE
VENTRICULAR RATE: 84 BPM

## 2025-01-30 PROCEDURE — 93010 ELECTROCARDIOGRAM REPORT: CPT | Performed by: INTERNAL MEDICINE

## 2025-01-30 PROCEDURE — 36415 COLL VENOUS BLD VENIPUNCTURE: CPT

## 2025-01-30 PROCEDURE — 3008F BODY MASS INDEX DOCD: CPT | Performed by: NURSE PRACTITIONER

## 2025-01-30 PROCEDURE — 87389 HIV-1 AG W/HIV-1&-2 AB AG IA: CPT

## 2025-01-30 PROCEDURE — 99214 OFFICE O/P EST MOD 30 MIN: CPT | Performed by: NURSE PRACTITIONER

## 2025-01-30 PROCEDURE — 83036 HEMOGLOBIN GLYCOSYLATED A1C: CPT

## 2025-01-30 PROCEDURE — 3078F DIAST BP <80 MM HG: CPT | Performed by: NURSE PRACTITIONER

## 2025-01-30 PROCEDURE — 93005 ELECTROCARDIOGRAM TRACING: CPT

## 2025-01-30 PROCEDURE — 3074F SYST BP LT 130 MM HG: CPT | Performed by: NURSE PRACTITIONER

## 2025-01-30 PROCEDURE — 80074 ACUTE HEPATITIS PANEL: CPT

## 2025-01-30 PROCEDURE — 86780 TREPONEMA PALLIDUM: CPT

## 2025-01-30 RX ORDER — METFORMIN HYDROCHLORIDE 500 MG/1
500 TABLET, EXTENDED RELEASE ORAL 2 TIMES DAILY WITH MEALS
Qty: 60 TABLET | Refills: 3 | Status: SHIPPED | OUTPATIENT
Start: 2025-01-30

## 2025-01-30 NOTE — PATIENT INSTRUCTIONS
Welcome to the [facility]!!  Thank you for placing your trust in our health care team, I look forward to working with you along this journey to better health!  Next steps:     1.  Schedule a personal nutrition consultation with one of our registered dieticians. Bring along your food journal (3 days minimum). See journal options below.  2.  Fill your prescribed medication and take as discussed and prescribed: metformin 500mg bid (take 1 tablet with breakfast and 1 tablet with dinner)- take with food  3. Get EKG done     Please try to work on the following dietary changes this first month:  Daily protein recommendation to start:  grams  Daily carbohydrate: <140g  Daily calories: 1,600-1,700  1.  Drink water with meals and throughout the day, cut down on soda and/or juice if consumed. Consider flavored water options like Bubbly, Spindrift, Hint and Sascha.  2.  Eat breakfast daily and focus on having protein with each meal, examples include: greek yogurt, cottage cheese, hard boiled egg, whole grain toast with peanut butter.   3.  Reduce refined carbohydrates and sugars which includes items such as sweets, as well as rice, pasta, and bread and make sure to choose whole grain options when having them with just 1 serving per meal about the size of your inner palm.  4.  Consume non starchy veggies daily working towards making them a good 50% of your daily food intake. Add them to lunch and dinner consistently.  5.  Optional can start a daily probiotic: VSL#3, (order on line at www.vsl3.com). Take 1 capsule daily with water for 30 days, then reduce to 1 every other day (this will reduce the cost). Capsules can be left out for 2 weeks, but then must be refrigerated.      Please download sophy My Fitness Pal, LoseIt! Or Net Diary to monitor daily dietary intake and you will be able to see if you are eating the right amount of calories or too much or too little which would hinder weight  loss. Additionally this will help to see your daily carbohydrate and protein intake. When you set the landry up choose 1-2 lbs/week as a goal.  Keeping a paper food journal is an option as well to remain accountable for your choices- this is the start to mindful eating! A low calorie diet has been consistently shown to support weight loss.     Continue or start exercising to help establish a routine. If not already exercising begin with 1 day and progress as able with long-term goal of 30 minutes 5 days a week at a minimum.     Meditation daily can help manage and control stress. Chronic stress can make weight loss difficult.  Exercising is one way to help with stress, but meditation using the CALM Landry or another comparable alternative can be done in your home or place of work with little time commitment. This Landry can also help work on behavior change and improve sleep. Check out the segment under Calm Masterclass and listen to The 4 Pillars of Health. A great way to begin learning about the foundation of lifestyle with practical tips to use in your every day.     Check out www.yourweightmatters.org blog for continued daily support and education along this weight loss journey!    Patient Resources:     Personal Training/Fitness Classes/Health Coaching     [facility] @ [email or web address] Full fitness center with group fitness and personal training. Discount available as client of [facility].  Health Coaching and Personal Training with Sindy Bates at our [facility]- individual weekly coaching with option to add personal training and small group fitness classes targeted at weight loss- [phone] and/or email @ [email or web address]  360FIT [place] [email or web address].[email or web address]. Group Fitness [phone] and/or email Halima at [email or web address]  [facility] Fitness Centers with multiple locations: Orange  [employer] Fitness (www.SageQuest), Eat The Frog Fitness (www.eatBlaze Medical Devices.Mobento), Lattice Engines Fitness (www.bCODE), The Exercise  (www.exercisecoach.Mobento)     Online Fitness  Fitness  on Utube  Fit in 10 DVD series- www.ghlfp33TVE.com  Sit and Be Fit - Chair exercise series Www.sitandbefit.org  Hip Hop Fit with Tru Oneill at www.hiphopfit.net     Apps for on the Go Fitness  MailFrontier 7 Minute Workout (orange box with white 7) - free on the go HIIT training landry  Peloton Landry @ www.onepeloton.com     Nutrition Trackers and Tools  LoseIT! And My Fitness Pal apps and on line for tracking nutrition  NOOM - virtual health coaching  FitFoundation (healthy meals on the go) in [place] @ www.gcaufmulhuidy9rSolar Pool Technologies  Bistro MD @ PixelFishbistromd.com and Npzclk22 (keto and low carb plans recommended) @ www.qfanpy07.com, Metabolic Meals @ www.Valcare MedicalMetabolicMeals.Mobento - individual prepared meals to go  Gobble, Blue Apron, Home , Every Plate, Sunbasket- on line meal delivery programs for preparation at home  Meal Village in [place] for homemade meals to go @ www.mealCellular Biomedicine Group (CBMG)llage.Mobento  Diet Doctor @ www.dietdoctor.Mobento - low carb swaps  YuEzyInsights - meal prep and planning landry (www.yummly.com)     Stress Management/Behavior/Mindful Eating  CALM meditation landry (www.calm.com)  Headspace  Am I Hungry? Mindful eating virtual  landry  Www.yourweightmatters.org - Obesity Action Coalition sponsored Blog posts daily  Motivation landry (black box with white \")- daily supportive messages sent to your phone     Books/Video Education/Podcasts  Mindless Eating by Laci Nguyen  Why We Get Sick by Ponce Rob (a book about insulin resistance)  Atomic Habits by Bruno Hurtado (a book about taking small steps to promote greater behavior change)   Can't Hurt Me by Guanako Russo (a book exploring the power of discipline in achieving your goals)  The End of Dieting: How to Live for Life by Dr. Mateo Mcintosh M.D. or listen to [facility]  Academy Podcast Episode 63: Understanding \"Nutritarian\" Eating w/Dr. Mateo Mcintosh  Your Body in Balance: The New Science of Food, Hormones, and Health by Dr. Bryan Alan  The Menopause Diet Plan by Ana Laura Irving and Mercedez Huffman  The Complete Guide to fasting by Dr. Rosa  Sugar, Salt & Fat by Roxanne Karimi, Ph.D, R.D.  Weight Loss Surgery Will Not Treat Food Addiction by Eri Roa Ph.D  The Game Changers- [place] Documentary on plant based nutrition  Fed Up - documentary about obesity (Free on Utube)  The Truth About Sugar - documentary on sugar (Free on BrakeQuotes.com, https://youtu.be/6O0mdseFY9u)  The Dr. Almeida T5 Wellness Plan by Dr. Joni Almeida MD  Fitlosophy Fitspiration - journal to better health (found at [employer] in fitness aisle)  What Happened to You?- a look at the impact trauma has on behavior written by Milton Conway and Dr. Issa Garcia  Whole Again by Juan David Pitts - discovering your true self after trauma  Neha Cardenas talk on Coalfire, The Call to Courage  Podcasts: The Exam Room by the Physician's Committee, Nutrition Facts by Dr. Mcfarland    We are here to support you with weight loss, but please remember that you still need your primary care provider for your routine health maintenance.

## 2025-01-30 NOTE — PROGRESS NOTES
HISTORY OF PRESENT ILLNESS  Chief Complaint   Patient presents with    Weight Problem     Recommendation from OB , never try meds and open for med. Interest in getting surgery       Georgia Hyatt is a 29 year old female new to our office today for initiation of medical weight loss program.  Patient presents today with c/o excess weight. Referred by, OB-GYNE    Has been struggling weight gain after 22-24 yrs old- after starting birth control injection..  Slowly gaining more weight each year...   +Over eating  +stress eating   Recently dx with PCOS from [facility]    Denies chest pain, shortness of breath, dizziness, blurred vision, headache, paresthesia, nausea/vomiting.     Reason/goal for weight loss: Lose 40-50 lbs in 6 months   Triggers for weight gain? Stress and Eating out  Previous weight loss programs? YES:  keto   Previous weight loss medications?  Semglutide (compound)     Reviewed [facility] patient contract: Readiness for Lifestyle change: 10/10, Interest in Medication: 10/10, Bariatric surgery interest: 7/10    Weight  Starting weight: 237  Max weight: 237  Lowest weight: 160    Wt Readings from Last 6 Encounters:   01/30/25 237 lb (107.5 kg)   01/27/25 234 lb (106.1 kg)   01/23/25 239 lb (108.4 kg)   12/13/24 235 lb (106.6 kg)   04/23/24 (P) 228 lb 6.3 oz (103.6 kg)   04/20/23 201 lb (91.2 kg)        Typical diet   Breakfast Lunch Dinner Snacks Fluids   2 eggs with a protein shake  Varies - salad with chicken, sandwich  Varies  High carb snacks and seeets  Water: adequate   Soda:  Juice:  Coffee/Tea: coffee  No sugar  Sugar free lemonade      Portion: medium to large   Eats 3 meals per day: yes   Number of restaurant or fast food meals/week: 3 meals/week    Social hx and lifestyle reviewed:    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)   Marital status: single, mother does most of cooking    Support: yes  Tobacco use: none  ETOH use: 1  per/week  Supplements: vitamin d   Exercise: 2 days walking  Stress  level: 9/10  Sleep hours and integrity: 6 Hours per night    MEDICAL HISTORY  PMH reviewed:   Cardiac disorders:negative   Depression/anxiety: anxiety   Glaucoma: negative  Kidney stones: negative  Eating disorder: negative  Migraines/seizures: negative  Joint-related conditions: negative  Liver disease: negative  Thyroid disease: negative  Constipation: negative  Diabetes: negative  Sleep Apnea hx: snoring - has never done sleep study   Cancer hx: negative  DVT: negative  Family or personal history of Pancreatic issues / Medullary Thyroid Cancer: negative  History of bariatric surgery: negative    FMH reviewed: obesity in parent/s or sibling: +    REVIEW OF SYSTEMS  GENERAL: feels well otherwise, and negative fatigue   SKIN: denies any rashes to skin folds  HEENT: snoring- yes; denies neck thickening  LUNGS: denies shortness of breath with exertion, no apnea  CARDIOVASCULAR: denies chest pain on exertion, denies palpitations or pedal edema  GI: denies abdominal pain, distention, No N/V/D/C  MUSCULOSKELETAL: denies back pain, joint pains   NEURO: denies headaches or dizziness  ENDOCRINE: denies any excess hunger, urination or thirst, denies any purple striae  PSYCH: denies change in behavior or mood, anxiety    EXAM  /60   Pulse 96   Resp 16   Ht 5' 4\" (1.626 m)   Wt 237 lb (107.5 kg)   LMP  (LMP Unknown)   BMI 40.68 kg/m² , Percent body fat: F >32% 43%;        GENERAL: well developed, well nourished, in no apparent distress  SKIN: warm, pink, dry without rashes to exposed area   EYES: conjunctiva pink, sclera non icteric, PERRLA  HEENT: atraumatic, normocephalic, O/p: Mallampati score- 2  NECK: supple, non tender, no adenopathy, no thyromegaly  LUNGS: CTA in all fields, breathing non labored  CARDIO: RRR without murmur, normal S1 and S2 without clicks or gallops, no pedal edema  GI: +BS, soft, no masses, HSM or tenderness  EXTREMITIES: grossly intact  NEURO: Oriented times three, full ROM of bilateral  UE/LE  PSYCH: pleasant, cooperative, normal mood and affect, no si.hi    Lab Results   Component Value Date    GLU 86 12/16/2024    BUN 11 12/16/2024    CREATSERUM 0.57 12/16/2024    ANIONGAP 4 12/16/2024    CA 10.3 12/16/2024    OSMOCALC 281 12/16/2024    ALKPHO 118 (H) 12/16/2024    AST 28 12/16/2024    ALT 54 (H) 12/16/2024    BILT 0.3 12/16/2024    TP 7.8 12/16/2024    ALB 4.3 12/16/2024    GLOBULIN 3.5 12/16/2024     12/16/2024    K 4.1 12/16/2024     12/16/2024    CO2 23.0 12/16/2024     Lab Results   Component Value Date     05/18/2023    A1C 5.1 05/18/2023     Lab Results   Component Value Date    CHOLEST 159 12/16/2024    TRIG 183 (H) 12/16/2024    HDL 39 (L) 12/16/2024    LDL 89 12/16/2024    VLDL 30 12/16/2024    NONHDLC 120 12/16/2024     Lab Results   Component Value Date    B12 492 12/16/2024     Lab Results   Component Value Date    VITD 12.7 (L) 12/16/2024       Medications Ordered Prior to Encounter[1]    ASSESSMENT/PLAN    ICD-10-CM    1. Therapeutic drug monitoring  Z51.81 OP REFERRAL TO [facility]     EKG 12 Lead performed at [facility]     metFORMIN  MG Oral Tablet 24 Hr      2. Obesity, morbid, BMI 40.0-49.9 (HCC)  E66.01 OP REFERRAL TO [facility]     EKG 12 Lead performed at [facility]     metFORMIN  MG Oral Tablet 24 Hr      3. High triglycerides  E78.1       4. Vitamin D deficiency  E55.9       5. PCOS (polycystic ovarian syndrome)  E28.2 OP REFERRAL TO [facility]     metFORMIN  MG Oral Tablet 24 Hr      6. Stress  F43.9         Initial Weight Data and Goal Weight Loss:  Weight Calculations  Initial Weight: 237 lbs  Initial Weight Date: 01/30/25  Today's Weight: 237 lbs  5% Goal: 11.85  10% Goal: 23.7  Total Weight Loss: 0 lbs  Initial consult: 237 lbs on 1/2025, Down  Lb:  lbs total     PLAN   Will starting medications: metformin 500mg bid for PCOS  --advised of side effects and adverse effects of this medication  Contradictions: needs EKG if doing  stimulant medication  Body composition test results given   Reviewed labs, baseline labs ordered  Continue with vitamin d (high dose from PCP)   Referral to behavioral psych   HLD  uncontrolled, elevated triglycerides, follows with PCP   PCOS, will trial metformin   Stress, discussed ways to help reduce this  Decrease carbs, increase protein, no skipping meals   Needs to incorporate exercise regimen FITTE: ACSM recommendations (150-300 minutes/ week in active weight loss)   Follow up with dietitian and psychologist as recommended.  Discussed the role of sleep and stress in weight management.  Counseled on comprehensive weight loss plan including attention to nutrition, exercise and behavior/stress management for success. See patient instruction below for more details.    Total time spent on chart review, pre-charting, obtaining history, counseling, and educating, reviewing labs was 50 minutes.       NOTE TO PATIENT: The 21st Century Cures Act makes clinical notes like these available to patients in the interest of transparency. Clinical notes are medical documents used by physicians and care providers to communicate with each other. These documents include medical language and terminology, abbreviations, and treatment information that may sound technical and at times possibly unfamiliar. In addition, at times, the verbiage may appear blunt or direct. These documents are one tool providers use to communicate relevant information and clinical opinions of the care providers in a way that allows common understanding of the clinical context.     Weight Loss Contract reviewed and signed today 1/30/2025    Patient Instructions   Welcome to the [facility] Management Program!!  Thank you for placing your trust in our health care team, I look forward to working with you along this journey to better health!  Next steps:     1.  Schedule a personal nutrition consultation with one of our registered dieticians. Bring  along your food journal (3 days minimum). See journal options below.  2.  Fill your prescribed medication and take as discussed and prescribed: metformin 500mg bid (take 1 tablet with breakfast and 1 tablet with dinner)- take with food  3. Get EKG done     Please try to work on the following dietary changes this first month:  Daily protein recommendation to start:  grams  Daily carbohydrate: <140g  Daily calories: 1,600-1,700  1.  Drink water with meals and throughout the day, cut down on soda and/or juice if consumed. Consider flavored water options like Bubbly, Spindrift, Hint and Sascha.  2.  Eat breakfast daily and focus on having protein with each meal, examples include: greek yogurt, cottage cheese, hard boiled egg, whole grain toast with peanut butter.   3.  Reduce refined carbohydrates and sugars which includes items such as sweets, as well as rice, pasta, and bread and make sure to choose whole grain options when having them with just 1 serving per meal about the size of your inner palm.  4.  Consume non starchy veggies daily working towards making them a good 50% of your daily food intake. Add them to lunch and dinner consistently.  5.  Optional can start a daily probiotic: VSL#3, (order on line at www.vsl3.com). Take 1 capsule daily with water for 30 days, then reduce to 1 every other day (this will reduce the cost). Capsules can be left out for 2 weeks, but then must be refrigerated.      Please download sophy My Fitness Pal, LoseIt! Or Net Diary to monitor daily dietary intake and you will be able to see if you are eating the right amount of calories or too much or too little which would hinder weight loss. Additionally this will help to see your daily carbohydrate and protein intake. When you set the sophy up choose 1-2 lbs/week as a goal.  Keeping a paper food journal is an option as well to remain accountable for your choices- this is the start to mindful eating! A low calorie diet has been  consistently shown to support weight loss.     Continue or start exercising to help establish a routine. If not already exercising begin with 1 day and progress as able with long-term goal of 30 minutes 5 days a week at a minimum.     Meditation daily can help manage and control stress. Chronic stress can make weight loss difficult.  Exercising is one way to help with stress, but meditation using the CALM Landry or another comparable alternative can be done in your home or place of work with little time commitment. This Landry can also help work on behavior change and improve sleep. Check out the segment under Calm Masterclass and listen to The 4 Pillars of Health. A great way to begin learning about the foundation of lifestyle with practical tips to use in your every day.     Check out www.yourweightmatters.org blog for continued daily support and education along this weight loss journey!    Patient Resources:     Personal Training/Fitness Classes/Health Coaching     [facility] @ [email or web address] Full fitness center with group fitness and personal training. Discount available as client of [facility].  Health Coaching and Personal Training with Sindy Bates at our [facility]- individual weekly coaching with option to add personal training and small group fitness classes targeted at weight loss- [phone] and/or email @ [email or web address]  360FIT [place] [email or web address] Group Fitness [phone] and/or email Halima at [email or web address]  FrancLists of hospitals in the [place] Statesed Fitness Centers with multiple locations: TriOviz (www.Gasp Solar), Women.com The Eoscene Fitness (www.Wooboard.com.Modern Boutique), Sentric Music (www.Immaculate Baking.Modern Boutique), The Exercise  (www.exercisecoach.Modern Boutique)     Online Fitness  Fitness  on Syandus  Fit in 10 DVD series- www.iwuhm75WVK.com  Sit and Be Fit - Chair  exercise series Www.sitandbefit.org  Hip Hop Fit with Tru Oneill at www.hiphopfit.net     Apps for on the Go Fitness  [place] 7 Minute Workout (orange box with white 7) - free on the go HIIT training landry  Peloton Landry @ www.onepeloton.com     Nutrition Trackers and Tools  LoseIT! And My Fitness Pal apps and on line for tracking nutrition  NOOM - virtual health coaching  FitFoundation (healthy meals on the go) in [place] @ www.jsyxlhwjzxhkf8sStockStreams  Eyad RICHARD @ wwwHELIX BIOMEDIXbistromd.com and Rcutle43 (keto and low carb plans recommended) @ wwwHELIX BIOMEDIXsjjjwz73.com, Metabolic Meals @ www.MyMetabolicMeals.[place] - individual prepared meals to go  Gobble, Blue Apron, Home , Every Plate, Sunbasket- on line meal delivery programs for preparation at home  Meal [place] in [place] for homemade meals to go @ www.mealFirst Choice Healthcare Solutionsage.[place]zy [place]  Diet Doctor @ www.dietdoctor.com - low carb swaps  YuRecycled Hydro Solutions - meal prep and planning landry (www.yummly.com)     Stress Management/Behavior/Mindful Eating  CALM meditation landry (www.calm.com)  Headspace  Am I Hungry? Mindful eating virtual  landry  Www.yourweightmatters.org - Obesity Action Coalition sponsored Blog posts daily  Motivation landry (black box with white \")- daily supportive messages sent to your phone     Books/Video Education/Podcasts  Mindless Eating by Laci Nguyen  Why We Get Sick by Ponce Rob (a book about insulin resistance)  Atomic Habits by Bruno Hurtado (a book about taking small steps to promote greater behavior change)   Can't Hurt Me by Guanako Russo (a book exploring the power of discipline in achieving your goals)  The End of Dieting: How to Live for Life by Dr. Mateo Mcintosh M.D. or listen to [employer] Podcast Episode 63: Understanding \"Nutritarian\" Eating w/Dr. Mateo Mcintosh  Your Body in Balance: The New Science of Food, Hormones, and Health by Dr. Bryan Alan  The Menopause Diet Plan by Ana Laura Irving and Mercedez Huffman  The Complete Guide to fasting by Dr. Rosa  Sugar, Salt &  Fat by Roxanne Karimi, Ph.D, R.D.  Weight Loss Surgery Will Not Treat Food Addiction by Eri Roa Ph.D  The Game Changers- Netflix Documentary on plant based nutrition  Fed Up - documentary about obesity (Free on Utube)  The Truth About Sugar - documentary on sugar (Free on Utube, https://youtu.be/7I2dmtqDK0t)  The Dr. Almeida T5 Wellness Plan by Dr. Joni Almeida MD  Fitlosophy Fitspiration - journal to better health (found at [employer] in fitness aisle)  What Happened to You?- a look at the impact trauma has on behavior written by Milton Conway and Dr. Issa Garcia  Whole Again by Juan David Pitts - discovering your true self after trauma  Neha Cardenas talk on Netflix, The Call to Courage  Podcasts: The Exam Room by the Physician's Committee, Nutrition Facts by Dr. Mcfarland    We are here to support you with weight loss, but please remember that you still need your primary care provider for your routine health maintenance.      Return in about 4 months (around 5/30/2025) for weight management.    Patient verbalizes understanding.    CONNIE Emerson           [1]   Current Outpatient Medications on File Prior to Visit   Medication Sig Dispense Refill    ofloxacin 0.3 % Otic Solution Place 10 drops into the right ear 2 (two) times daily for 10 days. 10 mL 0    medroxyPROGESTERone Acetate (PROVERA) 10 MG Oral Tab Take 1 tablet (10 mg total) by mouth daily for 10 days. Repeat every 2-3 months 60 tablet 1    ergocalciferol 1.25 MG (83370 UT) Oral Cap Take 1 capsule (50,000 Units total) by mouth once a week. After these are gone, take OTC vitamin D3 2000 IUs daily. 12 capsule 0     No current facility-administered medications on file prior to visit.

## 2025-02-09 ENCOUNTER — PATIENT MESSAGE (OUTPATIENT)
Dept: INTERNAL MEDICINE CLINIC | Facility: CLINIC | Age: 30
End: 2025-02-09

## 2025-02-09 DIAGNOSIS — E66.01 OBESITY, MORBID, BMI 40.0-49.9 (HCC): Primary | ICD-10-CM

## 2025-02-10 RX ORDER — PHENTERMINE HYDROCHLORIDE 15 MG/1
15 CAPSULE ORAL EVERY MORNING
Qty: 30 CAPSULE | Refills: 2 | Status: SHIPPED | OUTPATIENT
Start: 2025-02-10

## 2025-03-10 RX ORDER — ERGOCALCIFEROL 1.25 MG/1
CAPSULE, LIQUID FILLED ORAL
Qty: 12 CAPSULE | Refills: 0 | OUTPATIENT
Start: 2025-03-10

## 2025-03-26 ENCOUNTER — OFFICE VISIT (OUTPATIENT)
Dept: FAMILY MEDICINE CLINIC | Facility: CLINIC | Age: 30
End: 2025-03-26
Payer: COMMERCIAL

## 2025-03-26 VITALS
HEART RATE: 104 BPM | RESPIRATION RATE: 16 BRPM | SYSTOLIC BLOOD PRESSURE: 102 MMHG | DIASTOLIC BLOOD PRESSURE: 68 MMHG | WEIGHT: 234 LBS | TEMPERATURE: 98 F | OXYGEN SATURATION: 100 % | BODY MASS INDEX: 40 KG/M2

## 2025-03-26 DIAGNOSIS — L29.9 ITCHING OF EAR: ICD-10-CM

## 2025-03-26 DIAGNOSIS — H60.331 ACUTE SWIMMER'S EAR OF RIGHT SIDE: Primary | ICD-10-CM

## 2025-03-26 PROCEDURE — 99213 OFFICE O/P EST LOW 20 MIN: CPT | Performed by: NURSE PRACTITIONER

## 2025-03-26 PROCEDURE — 3078F DIAST BP <80 MM HG: CPT | Performed by: NURSE PRACTITIONER

## 2025-03-26 PROCEDURE — 3074F SYST BP LT 130 MM HG: CPT | Performed by: NURSE PRACTITIONER

## 2025-03-26 RX ORDER — MULTIVIT-MIN/IRON/FOLIC ACID/K 18-600-40
CAPSULE ORAL
COMMUNITY

## 2025-03-26 RX ORDER — MEDROXYPROGESTERONE ACETATE 10 MG
TABLET ORAL
COMMUNITY
Start: 2025-03-06

## 2025-03-26 RX ORDER — OFLOXACIN 3 MG/ML
10 SOLUTION AURICULAR (OTIC) DAILY
Qty: 5 ML | Refills: 0 | Status: SHIPPED | OUTPATIENT
Start: 2025-03-26 | End: 2025-04-02

## 2025-03-26 NOTE — PATIENT INSTRUCTIONS
1. Use Ofloxacin ear drops as prescribed  2. Use OTC Hydrocortisone cream on the itchy surface of the ear.  3. START Flonase nasal spray daily. 1 spray in each nostril to help with ear and sinus congestion and pressure.  4. START daily antihistamine (Zyrtec, Claritin, Allegra) and choose one of those. Take daily for 1-2 weeks to help ear and nasal congestion and pressure  5. For swimming use water ear plugs such as mack's ear plugs or ear seals (available over the counter)  6. Avoid use of q tips, do not insert anything into the ear  7. Drink lots of water and rest. Report any dizziness to PCP or seek care in emergency room for altered consciousness, dizziness that affects mobility, bleeding from ear canal, uncontrolled pain or fever  8. Tylenol and motrin as needed for pain  9. Follow up with PCP as needed.

## 2025-03-26 NOTE — PROGRESS NOTES
Subjective:   Patient ID: Georgia Hyatt is a 29 year old female.    Georgia presents today with right ear pain. She states she was treated for an external ear infection with Ofloxacin drops a month and a half ago. The drops helped the ear pain but the itchiness never went away. She has noticed over the last two weeks that the itchiness in the right ear has worsened then today woke up with new right ear pain, ear discharge, ear fullness, mild dizziness, and continued itchiness. She denies hearing problems, facial swelling, fever, chills, cough, SOB, stuffy nose, abdominal pain, and n/v/d. She denies a history of dry skin or eczema. She does not think she has environmental or seasonal allergies. She is not taking any medications OTC for symptoms relief.        History/Other:   Review of Systems   Constitutional:  Negative for chills and fever.   HENT:  Positive for ear discharge, ear pain (pain + pressure) and rhinorrhea. Negative for congestion, facial swelling, hearing loss, sinus pressure and sore throat.         Right ear intensely pruritic.    Respiratory:  Negative for cough and shortness of breath.    Gastrointestinal:  Negative for abdominal pain, diarrhea, nausea and vomiting.   Allergic/Immunologic: Negative for environmental allergies.   Neurological:  Positive for dizziness.     Current Outpatient Medications   Medication Sig Dispense Refill    Cholecalciferol (VITAMIN D) 50 MCG (2000 UT) Oral Cap Take by mouth.      ofloxacin 0.3 % Otic Solution Place 10 drops into the right ear daily for 7 days. 5 mL 0    Phentermine HCl 30 MG Oral Cap Take 1 capsule (30 mg total) by mouth every morning. 30 capsule 3    metFORMIN  MG Oral Tablet 24 Hr Take 1 tablet (500 mg total) by mouth 2 (two) times daily with meals. 60 tablet 3    medroxyPROGESTERone Acetate 10 MG Oral Tab  (Patient not taking: Reported on 3/26/2025)      ergocalciferol 1.25 MG (47879 UT) Oral Cap Take 1 capsule (50,000 Units total) by mouth  once a week. After these are gone, take OTC vitamin D3 2000 IUs daily. (Patient not taking: Reported on 3/26/2025) 12 capsule 0     Allergies:Allergies[1]    /68   Pulse 104   Temp 98.4 °F (36.9 °C) (Oral)   Resp 16   Wt 234 lb (106.1 kg)   LMP 03/12/2025 (Approximate)   SpO2 100%   BMI 40.17 kg/m²       Objective:   Physical Exam  Vitals reviewed.   Constitutional:       General: She is awake. She is not in acute distress.     Appearance: Normal appearance. She is well-developed and well-groomed. She is not ill-appearing, toxic-appearing or diaphoretic.   HENT:      Head: Normocephalic and atraumatic.      Right Ear: Hearing normal. Tenderness (with insertion of otoscope) present. No drainage. A middle ear effusion is present.      Left Ear: Hearing, ear canal and external ear normal. No tenderness. A middle ear effusion is present.      Ears:      Comments: R external ear canal erythematous with mild flaking noted.     Nose: Nose normal.      Right Turbinates: Not enlarged, swollen or pale.      Left Turbinates: Not enlarged, swollen or pale.      Mouth/Throat:      Lips: Pink.      Mouth: Mucous membranes are moist.      Pharynx: Posterior oropharyngeal erythema and postnasal drip present.      Tonsils: 2+ on the right. 2+ on the left.   Cardiovascular:      Rate and Rhythm: Regular rhythm. Tachycardia present.      Heart sounds: Normal heart sounds, S1 normal and S2 normal.      Comments: Tachycardia improved throughout visit.  at tend of visit.   Pulmonary:      Effort: Pulmonary effort is normal. No tachypnea.      Breath sounds: Normal breath sounds and air entry. No decreased air movement. No decreased breath sounds, wheezing, rhonchi or rales.   Lymphadenopathy:      Head:      Right side of head: No submental, submandibular, tonsillar or occipital adenopathy.      Left side of head: No submental, submandibular, tonsillar or occipital adenopathy.   Skin:     General: Skin is warm and dry.    Neurological:      Mental Status: She is alert and oriented to person, place, and time.   Psychiatric:         Attention and Perception: Attention and perception normal.         Mood and Affect: Mood and affect normal.         Speech: Speech normal.         Behavior: Behavior normal. Behavior is cooperative.         Thought Content: Thought content normal.         Cognition and Memory: Cognition and memory normal.         Judgment: Judgment normal.         Assessment & Plan:   1. Acute swimmer's ear of right side    2. Itching of ear        No orders of the defined types were placed in this encounter.      Meds This Visit:  Requested Prescriptions     Signed Prescriptions Disp Refills    ofloxacin 0.3 % Otic Solution 5 mL 0     Sig: Place 10 drops into the right ear daily for 7 days.     Symptoms and exam findings consistent with mild otitis externa of the right ear.  Prescribed Ofloxacin 0.3% drops and OTC hydrocortisone cream for itching.  Middle ear effusions presents. Encouraged use of OTC Flonase and Zyrtec.  Reassuring physical exam findings. HR tachycardiac x 2 with improvement during the visit.   No sign of RDS or dehydration at this time.  Supportive care and return to care measures reviewed.  Patient v/u and is comfortable with this plan.    Patient Instructions   1. Use Ofloxacin ear drops as prescribed  2. Use OTC Hydrocortisone cream on the itchy surface of the ear.  3. START Flonase nasal spray daily. 1 spray in each nostril to help with ear and sinus congestion and pressure.  4. START daily antihistamine (Zyrtec, Claritin, Allegra) and choose one of those. Take daily for 1-2 weeks to help ear and nasal congestion and pressure  5. For swimming use water ear plugs such as roderick's ear plugs or ear seals (available over the counter)  6. Avoid use of q tips, do not insert anything into the ear  7. Drink lots of water and rest. Report any dizziness to PCP or seek care in emergency room for altered  consciousness, dizziness that affects mobility, bleeding from ear canal, uncontrolled pain or fever  8. Tylenol and motrin as needed for pain  9. Follow up with PCP as needed.   CONNIE Michele student    I certify that I have supervised the student in his/her medical examination and care of this patient. The above documentation was carefully reviewed by me.  CONNIE Pathak         [1] No Known Allergies

## 2025-04-07 PROBLEM — F41.9 RECURRENT MODERATE MAJOR DEPRESSIVE DISORDER WITH ANXIETY (HCC): Status: ACTIVE | Noted: 2025-04-07

## 2025-04-07 PROBLEM — F33.1 RECURRENT MODERATE MAJOR DEPRESSIVE DISORDER WITH ANXIETY (HCC): Status: ACTIVE | Noted: 2025-04-07

## 2025-04-07 PROBLEM — F43.89 OTHER REACTIONS TO SEVERE STRESS: Status: ACTIVE | Noted: 2025-04-07

## 2025-04-24 ENCOUNTER — PATIENT MESSAGE (OUTPATIENT)
Dept: ADMINISTRATIVE | Age: 30
End: 2025-04-24

## 2025-06-09 ENCOUNTER — PATIENT MESSAGE (OUTPATIENT)
Dept: INTERNAL MEDICINE CLINIC | Facility: CLINIC | Age: 30
End: 2025-06-09